# Patient Record
Sex: FEMALE | Race: OTHER
[De-identification: names, ages, dates, MRNs, and addresses within clinical notes are randomized per-mention and may not be internally consistent; named-entity substitution may affect disease eponyms.]

---

## 2017-10-12 ENCOUNTER — ASOB RESULT (OUTPATIENT)
Age: 32
End: 2017-10-12

## 2017-10-12 ENCOUNTER — APPOINTMENT (OUTPATIENT)
Dept: ANTEPARTUM | Facility: CLINIC | Age: 32
End: 2017-10-12
Payer: MEDICAID

## 2017-10-12 PROBLEM — Z00.00 ENCOUNTER FOR PREVENTIVE HEALTH EXAMINATION: Status: ACTIVE | Noted: 2017-10-12

## 2017-10-12 PROCEDURE — 36416 COLLJ CAPILLARY BLOOD SPEC: CPT

## 2017-10-12 PROCEDURE — 76813 OB US NUCHAL MEAS 1 GEST: CPT

## 2017-10-12 PROCEDURE — 76801 OB US < 14 WKS SINGLE FETUS: CPT

## 2017-11-24 ENCOUNTER — ASOB RESULT (OUTPATIENT)
Age: 32
End: 2017-11-24

## 2017-11-24 ENCOUNTER — APPOINTMENT (OUTPATIENT)
Dept: ANTEPARTUM | Facility: CLINIC | Age: 32
End: 2017-11-24
Payer: MEDICAID

## 2017-11-24 PROCEDURE — 76811 OB US DETAILED SNGL FETUS: CPT

## 2017-11-30 ENCOUNTER — ASOB RESULT (OUTPATIENT)
Age: 32
End: 2017-11-30

## 2017-11-30 ENCOUNTER — APPOINTMENT (OUTPATIENT)
Dept: ANTEPARTUM | Facility: CLINIC | Age: 32
End: 2017-11-30
Payer: MEDICAID

## 2017-11-30 PROCEDURE — 76816 OB US FOLLOW-UP PER FETUS: CPT

## 2018-01-22 ENCOUNTER — APPOINTMENT (OUTPATIENT)
Dept: MATERNAL FETAL MEDICINE | Facility: CLINIC | Age: 33
End: 2018-01-22
Payer: MEDICAID

## 2018-01-22 DIAGNOSIS — O24.919 UNSPECIFIED DIABETES MELLITUS IN PREGNANCY, UNSPECIFIED TRIMESTER: ICD-10-CM

## 2018-01-22 PROCEDURE — G0109 DIAB MANAGE TRN IND/GROUP: CPT

## 2018-01-22 RX ORDER — LANCETS
EACH MISCELLANEOUS
Qty: 1 | Refills: 6 | Status: ACTIVE | COMMUNITY
Start: 2018-01-22 | End: 1900-01-01

## 2018-01-24 ENCOUNTER — APPOINTMENT (OUTPATIENT)
Dept: MATERNAL FETAL MEDICINE | Facility: CLINIC | Age: 33
End: 2018-01-24
Payer: MEDICAID

## 2018-01-24 PROCEDURE — G0109 DIAB MANAGE TRN IND/GROUP: CPT

## 2018-02-05 ENCOUNTER — APPOINTMENT (OUTPATIENT)
Dept: MATERNAL FETAL MEDICINE | Facility: CLINIC | Age: 33
End: 2018-02-05
Payer: MEDICAID

## 2018-02-05 ENCOUNTER — ASOB RESULT (OUTPATIENT)
Age: 33
End: 2018-02-05

## 2018-02-05 PROCEDURE — G0108 DIAB MANAGE TRN  PER INDIV: CPT

## 2018-02-22 ENCOUNTER — APPOINTMENT (OUTPATIENT)
Dept: MATERNAL FETAL MEDICINE | Facility: CLINIC | Age: 33
End: 2018-02-22
Payer: MEDICAID

## 2018-02-22 ENCOUNTER — APPOINTMENT (OUTPATIENT)
Dept: ANTEPARTUM | Facility: CLINIC | Age: 33
End: 2018-02-22
Payer: MEDICAID

## 2018-02-22 ENCOUNTER — ASOB RESULT (OUTPATIENT)
Age: 33
End: 2018-02-22

## 2018-02-22 PROCEDURE — 76816 OB US FOLLOW-UP PER FETUS: CPT

## 2018-02-22 PROCEDURE — G0108 DIAB MANAGE TRN  PER INDIV: CPT

## 2018-03-16 ENCOUNTER — APPOINTMENT (OUTPATIENT)
Dept: MATERNAL FETAL MEDICINE | Facility: CLINIC | Age: 33
End: 2018-03-16
Payer: MEDICAID

## 2018-03-16 ENCOUNTER — ASOB RESULT (OUTPATIENT)
Age: 33
End: 2018-03-16

## 2018-03-16 PROCEDURE — G0108 DIAB MANAGE TRN  PER INDIV: CPT

## 2018-03-29 ENCOUNTER — ASOB RESULT (OUTPATIENT)
Age: 33
End: 2018-03-29

## 2018-03-29 ENCOUNTER — APPOINTMENT (OUTPATIENT)
Dept: ANTEPARTUM | Facility: CLINIC | Age: 33
End: 2018-03-29
Payer: MEDICAID

## 2018-03-29 PROCEDURE — 76816 OB US FOLLOW-UP PER FETUS: CPT

## 2018-03-30 ENCOUNTER — ASOB RESULT (OUTPATIENT)
Age: 33
End: 2018-03-30

## 2018-03-30 ENCOUNTER — APPOINTMENT (OUTPATIENT)
Dept: MATERNAL FETAL MEDICINE | Facility: CLINIC | Age: 33
End: 2018-03-30
Payer: MEDICAID

## 2018-03-30 PROCEDURE — G0108 DIAB MANAGE TRN  PER INDIV: CPT

## 2018-03-30 RX ORDER — BLOOD SUGAR DIAGNOSTIC
STRIP MISCELLANEOUS
Qty: 3 | Refills: 0 | Status: ACTIVE | COMMUNITY
Start: 2018-01-22 | End: 1900-01-01

## 2018-03-30 RX ORDER — URINE ACETONE TEST STRIPS
STRIP MISCELLANEOUS
Qty: 1 | Refills: 3 | Status: ACTIVE | COMMUNITY
Start: 2018-01-22 | End: 1900-01-01

## 2018-04-16 ENCOUNTER — ASOB RESULT (OUTPATIENT)
Age: 33
End: 2018-04-16

## 2018-04-16 ENCOUNTER — APPOINTMENT (OUTPATIENT)
Dept: ANTEPARTUM | Facility: CLINIC | Age: 33
End: 2018-04-16
Payer: MEDICAID

## 2018-04-16 PROCEDURE — 76819 FETAL BIOPHYS PROFIL W/O NST: CPT

## 2018-04-16 PROCEDURE — 76816 OB US FOLLOW-UP PER FETUS: CPT

## 2018-04-19 ENCOUNTER — ASOB RESULT (OUTPATIENT)
Age: 33
End: 2018-04-19

## 2018-04-19 ENCOUNTER — APPOINTMENT (OUTPATIENT)
Dept: ANTEPARTUM | Facility: CLINIC | Age: 33
End: 2018-04-19
Payer: MEDICAID

## 2018-04-19 PROCEDURE — 76818 FETAL BIOPHYS PROFILE W/NST: CPT

## 2018-04-22 ENCOUNTER — INPATIENT (INPATIENT)
Facility: HOSPITAL | Age: 33
LOS: 3 days | Discharge: HOME | End: 2018-04-26
Attending: OBSTETRICS & GYNECOLOGY | Admitting: OBSTETRICS & GYNECOLOGY
Payer: MEDICAID

## 2018-04-22 ENCOUNTER — TRANSCRIPTION ENCOUNTER (OUTPATIENT)
Age: 33
End: 2018-04-22

## 2018-04-22 VITALS — WEIGHT: 141.1 LBS | HEIGHT: 64 IN

## 2018-04-22 DIAGNOSIS — O24.419 GESTATIONAL DIABETES MELLITUS IN PREGNANCY, UNSPECIFIED CONTROL: ICD-10-CM

## 2018-04-22 LAB
BASOPHILS # BLD AUTO: 0 K/UL — SIGNIFICANT CHANGE UP (ref 0–0.2)
BASOPHILS NFR BLD AUTO: 0.5 % — SIGNIFICANT CHANGE UP (ref 0–2)
EOSINOPHIL # BLD AUTO: 0 K/UL — SIGNIFICANT CHANGE UP (ref 0–0.5)
EOSINOPHIL NFR BLD AUTO: 0.7 % — SIGNIFICANT CHANGE UP (ref 0–6)
HCT VFR BLD CALC: 35.1 % — SIGNIFICANT CHANGE UP (ref 34.5–45)
HGB BLD-MCNC: 12.5 G/DL — SIGNIFICANT CHANGE UP (ref 11.5–15.5)
LYMPHOCYTES # BLD AUTO: 1.8 K/UL — SIGNIFICANT CHANGE UP (ref 1–3.3)
LYMPHOCYTES # BLD AUTO: 26.6 % — SIGNIFICANT CHANGE UP (ref 13–44)
MCHC RBC-ENTMCNC: 34.5 PG — HIGH (ref 27–34)
MCHC RBC-ENTMCNC: 35.8 GM/DL — SIGNIFICANT CHANGE UP (ref 32–36)
MCV RBC AUTO: 96.3 FL — SIGNIFICANT CHANGE UP (ref 80–100)
MONOCYTES # BLD AUTO: 0.5 K/UL — SIGNIFICANT CHANGE UP (ref 0–0.9)
MONOCYTES NFR BLD AUTO: 7.5 % — SIGNIFICANT CHANGE UP (ref 2–14)
NEUTROPHILS # BLD AUTO: 4.4 K/UL — SIGNIFICANT CHANGE UP (ref 1.8–7.4)
NEUTROPHILS NFR BLD AUTO: 64.7 % — SIGNIFICANT CHANGE UP (ref 43–77)
PLATELET # BLD AUTO: 107 K/UL — LOW (ref 150–400)
RBC # BLD: 3.64 M/UL — LOW (ref 3.8–5.2)
RBC # FLD: 11.8 % — SIGNIFICANT CHANGE UP (ref 10.3–14.5)
WBC # BLD: 6.8 K/UL — SIGNIFICANT CHANGE UP (ref 3.8–10.5)
WBC # FLD AUTO: 6.8 K/UL — SIGNIFICANT CHANGE UP (ref 3.8–10.5)

## 2018-04-22 RX ORDER — SODIUM CHLORIDE 9 MG/ML
1000 INJECTION, SOLUTION INTRAVENOUS
Qty: 0 | Refills: 0 | Status: DISCONTINUED | OUTPATIENT
Start: 2018-04-22 | End: 2018-04-23

## 2018-04-22 RX ORDER — SODIUM CHLORIDE 9 MG/ML
1000 INJECTION, SOLUTION INTRAVENOUS ONCE
Qty: 0 | Refills: 0 | Status: DISCONTINUED | OUTPATIENT
Start: 2018-04-22 | End: 2018-04-23

## 2018-04-22 RX ORDER — CITRIC ACID/SODIUM CITRATE 300-500 MG
15 SOLUTION, ORAL ORAL EVERY 4 HOURS
Qty: 0 | Refills: 0 | Status: DISCONTINUED | OUTPATIENT
Start: 2018-04-22 | End: 2018-04-23

## 2018-04-22 RX ORDER — OXYTOCIN 10 UNIT/ML
333.33 VIAL (ML) INJECTION
Qty: 20 | Refills: 0 | Status: DISCONTINUED | OUTPATIENT
Start: 2018-04-22 | End: 2018-04-23

## 2018-04-22 RX ADMIN — Medication 15 MILLILITER(S): at 22:16

## 2018-04-23 ENCOUNTER — RESULT REVIEW (OUTPATIENT)
Age: 33
End: 2018-04-23

## 2018-04-23 LAB
BLD GP AB SCN SERPL QL: NEGATIVE — SIGNIFICANT CHANGE UP
GLUCOSE BLDC GLUCOMTR-MCNC: 100 MG/DL — HIGH (ref 70–99)
GLUCOSE BLDC GLUCOMTR-MCNC: 75 MG/DL — SIGNIFICANT CHANGE UP (ref 70–99)
GLUCOSE BLDC GLUCOMTR-MCNC: 77 MG/DL — SIGNIFICANT CHANGE UP (ref 70–99)
GLUCOSE BLDC GLUCOMTR-MCNC: 93 MG/DL — SIGNIFICANT CHANGE UP (ref 70–99)
GLUCOSE BLDC GLUCOMTR-MCNC: 96 MG/DL — SIGNIFICANT CHANGE UP (ref 70–99)
GLUCOSE BLDC GLUCOMTR-MCNC: 99 MG/DL — SIGNIFICANT CHANGE UP (ref 70–99)
RH IG SCN BLD-IMP: POSITIVE — SIGNIFICANT CHANGE UP
RH IG SCN BLD-IMP: POSITIVE — SIGNIFICANT CHANGE UP
T PALLIDUM AB TITR SER: NEGATIVE — SIGNIFICANT CHANGE UP

## 2018-04-23 PROCEDURE — 88307 TISSUE EXAM BY PATHOLOGIST: CPT | Mod: 26

## 2018-04-23 RX ORDER — DOCUSATE SODIUM 100 MG
100 CAPSULE ORAL
Qty: 0 | Refills: 0 | Status: DISCONTINUED | OUTPATIENT
Start: 2018-04-24 | End: 2018-04-26

## 2018-04-23 RX ORDER — GLYCERIN ADULT
1 SUPPOSITORY, RECTAL RECTAL AT BEDTIME
Qty: 0 | Refills: 0 | Status: DISCONTINUED | OUTPATIENT
Start: 2018-04-23 | End: 2018-04-23

## 2018-04-23 RX ORDER — LANOLIN
1 OINTMENT (GRAM) TOPICAL
Qty: 0 | Refills: 0 | Status: DISCONTINUED | OUTPATIENT
Start: 2018-04-24 | End: 2018-04-26

## 2018-04-23 RX ORDER — IBUPROFEN 200 MG
600 TABLET ORAL EVERY 6 HOURS
Qty: 0 | Refills: 0 | Status: COMPLETED | OUTPATIENT
Start: 2018-04-23 | End: 2019-03-22

## 2018-04-23 RX ORDER — OXYCODONE HYDROCHLORIDE 5 MG/1
5 TABLET ORAL
Qty: 0 | Refills: 0 | Status: COMPLETED | OUTPATIENT
Start: 2018-04-23 | End: 2018-04-30

## 2018-04-23 RX ORDER — OXYTOCIN 10 UNIT/ML
333.33 VIAL (ML) INJECTION
Qty: 20 | Refills: 0 | Status: DISCONTINUED | OUTPATIENT
Start: 2018-04-23 | End: 2018-04-26

## 2018-04-23 RX ORDER — SODIUM CHLORIDE 9 MG/ML
1000 INJECTION, SOLUTION INTRAVENOUS
Qty: 0 | Refills: 0 | Status: DISCONTINUED | OUTPATIENT
Start: 2018-04-23 | End: 2018-04-23

## 2018-04-23 RX ORDER — DIPHENHYDRAMINE HCL 50 MG
25 CAPSULE ORAL EVERY 6 HOURS
Qty: 0 | Refills: 0 | Status: DISCONTINUED | OUTPATIENT
Start: 2018-04-24 | End: 2018-04-26

## 2018-04-23 RX ORDER — DEXAMETHASONE 0.5 MG/5ML
4 ELIXIR ORAL EVERY 6 HOURS
Qty: 0 | Refills: 0 | Status: DISCONTINUED | OUTPATIENT
Start: 2018-04-23 | End: 2018-04-25

## 2018-04-23 RX ORDER — FERROUS SULFATE 325(65) MG
325 TABLET ORAL DAILY
Qty: 0 | Refills: 0 | Status: DISCONTINUED | OUTPATIENT
Start: 2018-04-24 | End: 2018-04-26

## 2018-04-23 RX ORDER — OXYTOCIN 10 UNIT/ML
41.67 VIAL (ML) INJECTION
Qty: 20 | Refills: 0 | Status: DISCONTINUED | OUTPATIENT
Start: 2018-04-24 | End: 2018-04-26

## 2018-04-23 RX ORDER — OXYCODONE HYDROCHLORIDE 5 MG/1
5 TABLET ORAL
Qty: 0 | Refills: 0 | Status: DISCONTINUED | OUTPATIENT
Start: 2018-04-23 | End: 2018-04-26

## 2018-04-23 RX ORDER — OXYTOCIN 10 UNIT/ML
4 VIAL (ML) INJECTION
Qty: 30 | Refills: 0 | Status: DISCONTINUED | OUTPATIENT
Start: 2018-04-23 | End: 2018-04-26

## 2018-04-23 RX ORDER — TETANUS TOXOID, REDUCED DIPHTHERIA TOXOID AND ACELLULAR PERTUSSIS VACCINE, ADSORBED 5; 2.5; 8; 8; 2.5 [IU]/.5ML; [IU]/.5ML; UG/.5ML; UG/.5ML; UG/.5ML
0.5 SUSPENSION INTRAMUSCULAR ONCE
Qty: 0 | Refills: 0 | Status: DISCONTINUED | OUTPATIENT
Start: 2018-04-24 | End: 2018-04-26

## 2018-04-23 RX ORDER — DIPHENHYDRAMINE HCL 50 MG
25 CAPSULE ORAL EVERY 6 HOURS
Qty: 0 | Refills: 0 | Status: DISCONTINUED | OUTPATIENT
Start: 2018-04-23 | End: 2018-04-23

## 2018-04-23 RX ORDER — SIMETHICONE 80 MG/1
80 TABLET, CHEWABLE ORAL EVERY 4 HOURS
Qty: 0 | Refills: 0 | Status: DISCONTINUED | OUTPATIENT
Start: 2018-04-24 | End: 2018-04-26

## 2018-04-23 RX ORDER — NALOXONE HYDROCHLORIDE 4 MG/.1ML
0.1 SPRAY NASAL
Qty: 0 | Refills: 0 | Status: DISCONTINUED | OUTPATIENT
Start: 2018-04-23 | End: 2018-04-25

## 2018-04-23 RX ORDER — HEPARIN SODIUM 5000 [USP'U]/ML
5000 INJECTION INTRAVENOUS; SUBCUTANEOUS EVERY 12 HOURS
Qty: 0 | Refills: 0 | Status: DISCONTINUED | OUTPATIENT
Start: 2018-04-24 | End: 2018-04-26

## 2018-04-23 RX ORDER — SODIUM CHLORIDE 9 MG/ML
1000 INJECTION, SOLUTION INTRAVENOUS
Qty: 0 | Refills: 0 | Status: DISCONTINUED | OUTPATIENT
Start: 2018-04-24 | End: 2018-04-26

## 2018-04-23 RX ORDER — ONDANSETRON 8 MG/1
4 TABLET, FILM COATED ORAL EVERY 6 HOURS
Qty: 0 | Refills: 0 | Status: DISCONTINUED | OUTPATIENT
Start: 2018-04-23 | End: 2018-04-25

## 2018-04-23 RX ORDER — OXYTOCIN 10 UNIT/ML
41.67 VIAL (ML) INJECTION
Qty: 20 | Refills: 0 | Status: DISCONTINUED | OUTPATIENT
Start: 2018-04-23 | End: 2018-04-26

## 2018-04-23 RX ORDER — GLYCERIN ADULT
1 SUPPOSITORY, RECTAL RECTAL AT BEDTIME
Qty: 0 | Refills: 0 | Status: DISCONTINUED | OUTPATIENT
Start: 2018-04-24 | End: 2018-04-26

## 2018-04-23 RX ORDER — OXYTOCIN 10 UNIT/ML
41.67 VIAL (ML) INJECTION
Qty: 20 | Refills: 0 | Status: DISCONTINUED | OUTPATIENT
Start: 2018-04-23 | End: 2018-04-23

## 2018-04-23 RX ORDER — SODIUM CHLORIDE 9 MG/ML
1000 INJECTION INTRAMUSCULAR; INTRAVENOUS; SUBCUTANEOUS
Qty: 0 | Refills: 0 | Status: DISCONTINUED | OUTPATIENT
Start: 2018-04-23 | End: 2018-04-26

## 2018-04-23 RX ORDER — FENTANYL CITRATE 50 UG/ML
250 INJECTION INTRAVENOUS
Qty: 0 | Refills: 0 | Status: DISCONTINUED | OUTPATIENT
Start: 2018-04-23 | End: 2018-04-25

## 2018-04-23 RX ORDER — FERROUS SULFATE 325(65) MG
325 TABLET ORAL DAILY
Qty: 0 | Refills: 0 | Status: DISCONTINUED | OUTPATIENT
Start: 2018-04-23 | End: 2018-04-23

## 2018-04-23 RX ORDER — OXYCODONE HYDROCHLORIDE 5 MG/1
5 TABLET ORAL EVERY 4 HOURS
Qty: 0 | Refills: 0 | Status: COMPLETED | OUTPATIENT
Start: 2018-04-23 | End: 2018-04-30

## 2018-04-23 RX ORDER — SODIUM CHLORIDE 9 MG/ML
1000 INJECTION, SOLUTION INTRAVENOUS
Qty: 0 | Refills: 0 | Status: DISCONTINUED | OUTPATIENT
Start: 2018-04-23 | End: 2018-04-24

## 2018-04-23 RX ORDER — ACETAMINOPHEN 500 MG
975 TABLET ORAL EVERY 6 HOURS
Qty: 0 | Refills: 0 | Status: DISCONTINUED | OUTPATIENT
Start: 2018-04-23 | End: 2018-04-26

## 2018-04-23 RX ORDER — SODIUM CHLORIDE 9 MG/ML
1000 INJECTION, SOLUTION INTRAVENOUS
Qty: 0 | Refills: 0 | Status: DISCONTINUED | OUTPATIENT
Start: 2018-04-23 | End: 2018-04-26

## 2018-04-23 RX ORDER — DOCUSATE SODIUM 100 MG
100 CAPSULE ORAL
Qty: 0 | Refills: 0 | Status: DISCONTINUED | OUTPATIENT
Start: 2018-04-23 | End: 2018-04-26

## 2018-04-23 RX ORDER — LANOLIN
1 OINTMENT (GRAM) TOPICAL
Qty: 0 | Refills: 0 | Status: DISCONTINUED | OUTPATIENT
Start: 2018-04-23 | End: 2018-04-26

## 2018-04-23 RX ORDER — TETANUS TOXOID, REDUCED DIPHTHERIA TOXOID AND ACELLULAR PERTUSSIS VACCINE, ADSORBED 5; 2.5; 8; 8; 2.5 [IU]/.5ML; [IU]/.5ML; UG/.5ML; UG/.5ML; UG/.5ML
0.5 SUSPENSION INTRAMUSCULAR ONCE
Qty: 0 | Refills: 0 | Status: DISCONTINUED | OUTPATIENT
Start: 2018-04-23 | End: 2018-04-26

## 2018-04-23 RX ORDER — KETOROLAC TROMETHAMINE 30 MG/ML
30 SYRINGE (ML) INJECTION EVERY 6 HOURS
Qty: 0 | Refills: 0 | Status: DISCONTINUED | OUTPATIENT
Start: 2018-04-23 | End: 2018-04-25

## 2018-04-23 RX ORDER — SIMETHICONE 80 MG/1
80 TABLET, CHEWABLE ORAL EVERY 4 HOURS
Qty: 0 | Refills: 0 | Status: DISCONTINUED | OUTPATIENT
Start: 2018-04-23 | End: 2018-04-23

## 2018-04-23 RX ORDER — DIPHENHYDRAMINE HCL 50 MG
25 CAPSULE ORAL EVERY 4 HOURS
Qty: 0 | Refills: 0 | Status: DISCONTINUED | OUTPATIENT
Start: 2018-04-23 | End: 2018-04-25

## 2018-04-23 RX ADMIN — Medication 1000 MILLIUNIT(S)/MIN: at 21:48

## 2018-04-23 RX ADMIN — Medication 4 MILLIUNIT(S)/MIN: at 14:01

## 2018-04-23 RX ADMIN — FENTANYL CITRATE 250 MILLILITER(S): 50 INJECTION INTRAVENOUS at 22:58

## 2018-04-23 RX ADMIN — Medication 0.2 MILLIGRAM(S): at 21:49

## 2018-04-23 RX ADMIN — Medication 15 MILLILITER(S): at 14:00

## 2018-04-23 RX ADMIN — Medication 15 MILLILITER(S): at 02:20

## 2018-04-23 RX ADMIN — Medication 0.25 MILLIGRAM(S): at 21:26

## 2018-04-24 LAB
HCT VFR BLD CALC: 30.8 % — LOW (ref 34.5–45)
HGB BLD-MCNC: 10.9 G/DL — LOW (ref 11.5–15.5)
MCHC RBC-ENTMCNC: 34.3 PG — HIGH (ref 27–34)
MCHC RBC-ENTMCNC: 35.4 GM/DL — SIGNIFICANT CHANGE UP (ref 32–36)
MCV RBC AUTO: 96.8 FL — SIGNIFICANT CHANGE UP (ref 80–100)
PLATELET # BLD AUTO: 87 K/UL — LOW (ref 150–400)
RBC # BLD: 3.19 M/UL — LOW (ref 3.8–5.2)
RBC # FLD: 12 % — SIGNIFICANT CHANGE UP (ref 10.3–14.5)
WBC # BLD: 16.1 K/UL — HIGH (ref 3.8–10.5)
WBC # FLD AUTO: 16.1 K/UL — HIGH (ref 3.8–10.5)

## 2018-04-24 RX ADMIN — SIMETHICONE 80 MILLIGRAM(S): 80 TABLET, CHEWABLE ORAL at 13:40

## 2018-04-24 RX ADMIN — FENTANYL CITRATE 250 MILLILITER(S): 50 INJECTION INTRAVENOUS at 17:22

## 2018-04-24 RX ADMIN — Medication 30 MILLIGRAM(S): at 11:10

## 2018-04-24 RX ADMIN — Medication 30 MILLIGRAM(S): at 04:11

## 2018-04-24 RX ADMIN — Medication 1 TABLET(S): at 13:38

## 2018-04-24 RX ADMIN — HEPARIN SODIUM 5000 UNIT(S): 5000 INJECTION INTRAVENOUS; SUBCUTANEOUS at 16:50

## 2018-04-24 RX ADMIN — HEPARIN SODIUM 5000 UNIT(S): 5000 INJECTION INTRAVENOUS; SUBCUTANEOUS at 04:11

## 2018-04-24 RX ADMIN — Medication 30 MILLIGRAM(S): at 17:12

## 2018-04-24 RX ADMIN — FENTANYL CITRATE 250 MILLILITER(S): 50 INJECTION INTRAVENOUS at 02:36

## 2018-04-24 RX ADMIN — Medication 30 MILLIGRAM(S): at 22:28

## 2018-04-24 RX ADMIN — Medication 325 MILLIGRAM(S): at 13:39

## 2018-04-24 RX ADMIN — Medication 30 MILLIGRAM(S): at 23:00

## 2018-04-24 RX ADMIN — Medication 30 MILLIGRAM(S): at 04:41

## 2018-04-24 RX ADMIN — Medication 30 MILLIGRAM(S): at 10:40

## 2018-04-24 RX ADMIN — Medication 30 MILLIGRAM(S): at 16:42

## 2018-04-24 NOTE — DIETITIAN INITIAL EVALUATION ADULT. - ENERGY NEEDS
Height: 64 inches, Weight: 133 pounds (pre-pregnancy wt)  BMI: 22.8 kg/m2 IBW: 120 pounds (+/-10%), %IBW: 111%  Pertinent Info: Per chart, 34 y/o female , s/p C/S @ 41weeks POD #1.

## 2018-04-24 NOTE — DIETITIAN INITIAL EVALUATION ADULT. - PERTINENT MEDS FT
lactated ringer @ 125cc/hr, D5%+NaCl 0.45% @ 125cc/hr, colace, ferrous sulfate, prenatal MVI, decadron, zofran

## 2018-04-24 NOTE — DIETITIAN INITIAL EVALUATION ADULT. - NS AS NUTRI INTERV ED CONTENT
Postpartum GDM diet education provided. Encouraged pt to continue good po intake of meals, select healthier choices of food, limiting over consumption of simple sugars/beverages to avoid the increased risk of developing T2DM, incorporating more nutrient dense foods such as fruits, vegetables, lean meats, and whole grains. Discussed the increase in nutrient needs if pt decides to breastfeed. Encouraged pt to follow up with MD for 2 hour GTT from 4-12 weeks postpartum. Recommended pt set wt loss goal to 2-4 pounds a month to promote healthy wt management and physical activity once cleared by MD.

## 2018-04-24 NOTE — DIETITIAN INITIAL EVALUATION ADULT. - OTHER INFO
Nutrition consult received for GDM diet controlled. Pt reports fair appetite and po intakes, some nausea yesterday but feels much better today. NKFA. PTA was taking prenatal MVI. Pt plans to breast feed, receptive to post partum GDM diet education provided.

## 2018-04-25 LAB
HCT VFR BLD CALC: 28.5 % — LOW (ref 34.5–45)
HGB BLD-MCNC: 10.1 G/DL — LOW (ref 11.5–15.5)
MCHC RBC-ENTMCNC: 34.6 PG — HIGH (ref 27–34)
MCHC RBC-ENTMCNC: 35.4 GM/DL — SIGNIFICANT CHANGE UP (ref 32–36)
MCV RBC AUTO: 97.6 FL — SIGNIFICANT CHANGE UP (ref 80–100)
PLATELET # BLD AUTO: 92 K/UL — LOW (ref 150–400)
RBC # BLD: 2.91 M/UL — LOW (ref 3.8–5.2)
RBC # FLD: 11.8 % — SIGNIFICANT CHANGE UP (ref 10.3–14.5)
WBC # BLD: 14.1 K/UL — HIGH (ref 3.8–10.5)
WBC # FLD AUTO: 14.1 K/UL — HIGH (ref 3.8–10.5)

## 2018-04-25 RX ORDER — IBUPROFEN 200 MG
600 TABLET ORAL EVERY 6 HOURS
Qty: 0 | Refills: 0 | Status: DISCONTINUED | OUTPATIENT
Start: 2018-04-25 | End: 2018-04-26

## 2018-04-25 RX ORDER — OXYCODONE HYDROCHLORIDE 5 MG/1
5 TABLET ORAL
Qty: 0 | Refills: 0 | Status: DISCONTINUED | OUTPATIENT
Start: 2018-04-25 | End: 2018-04-26

## 2018-04-25 RX ORDER — OXYCODONE HYDROCHLORIDE 5 MG/1
5 TABLET ORAL EVERY 4 HOURS
Qty: 0 | Refills: 0 | Status: DISCONTINUED | OUTPATIENT
Start: 2018-04-25 | End: 2018-04-26

## 2018-04-25 RX ADMIN — OXYCODONE HYDROCHLORIDE 5 MILLIGRAM(S): 5 TABLET ORAL at 16:30

## 2018-04-25 RX ADMIN — HEPARIN SODIUM 5000 UNIT(S): 5000 INJECTION INTRAVENOUS; SUBCUTANEOUS at 17:15

## 2018-04-25 RX ADMIN — HEPARIN SODIUM 5000 UNIT(S): 5000 INJECTION INTRAVENOUS; SUBCUTANEOUS at 05:25

## 2018-04-25 RX ADMIN — Medication 325 MILLIGRAM(S): at 13:10

## 2018-04-25 RX ADMIN — Medication 600 MILLIGRAM(S): at 18:56

## 2018-04-25 RX ADMIN — Medication 975 MILLIGRAM(S): at 15:49

## 2018-04-25 RX ADMIN — OXYCODONE HYDROCHLORIDE 5 MILLIGRAM(S): 5 TABLET ORAL at 19:30

## 2018-04-25 RX ADMIN — OXYCODONE HYDROCHLORIDE 5 MILLIGRAM(S): 5 TABLET ORAL at 22:44

## 2018-04-25 RX ADMIN — OXYCODONE HYDROCHLORIDE 5 MILLIGRAM(S): 5 TABLET ORAL at 15:49

## 2018-04-25 RX ADMIN — OXYCODONE HYDROCHLORIDE 5 MILLIGRAM(S): 5 TABLET ORAL at 13:10

## 2018-04-25 RX ADMIN — OXYCODONE HYDROCHLORIDE 5 MILLIGRAM(S): 5 TABLET ORAL at 23:15

## 2018-04-25 RX ADMIN — OXYCODONE HYDROCHLORIDE 5 MILLIGRAM(S): 5 TABLET ORAL at 09:30

## 2018-04-25 RX ADMIN — Medication 30 MILLIGRAM(S): at 05:22

## 2018-04-25 RX ADMIN — Medication 975 MILLIGRAM(S): at 08:51

## 2018-04-25 RX ADMIN — OXYCODONE HYDROCHLORIDE 5 MILLIGRAM(S): 5 TABLET ORAL at 08:51

## 2018-04-25 RX ADMIN — SIMETHICONE 80 MILLIGRAM(S): 80 TABLET, CHEWABLE ORAL at 15:49

## 2018-04-25 RX ADMIN — Medication 975 MILLIGRAM(S): at 09:30

## 2018-04-25 RX ADMIN — OXYCODONE HYDROCHLORIDE 5 MILLIGRAM(S): 5 TABLET ORAL at 18:56

## 2018-04-25 RX ADMIN — Medication 600 MILLIGRAM(S): at 19:30

## 2018-04-25 RX ADMIN — OXYCODONE HYDROCHLORIDE 5 MILLIGRAM(S): 5 TABLET ORAL at 14:00

## 2018-04-25 RX ADMIN — Medication 30 MILLIGRAM(S): at 04:41

## 2018-04-25 RX ADMIN — SIMETHICONE 80 MILLIGRAM(S): 80 TABLET, CHEWABLE ORAL at 05:27

## 2018-04-25 RX ADMIN — Medication 975 MILLIGRAM(S): at 16:30

## 2018-04-26 ENCOUNTER — TRANSCRIPTION ENCOUNTER (OUTPATIENT)
Age: 33
End: 2018-04-26

## 2018-04-26 VITALS
DIASTOLIC BLOOD PRESSURE: 60 MMHG | TEMPERATURE: 98 F | RESPIRATION RATE: 18 BRPM | SYSTOLIC BLOOD PRESSURE: 95 MMHG | HEART RATE: 65 BPM

## 2018-04-26 LAB
HCT VFR BLD CALC: 29.7 % — LOW (ref 34.5–45)
HGB BLD-MCNC: 10.4 G/DL — LOW (ref 11.5–15.5)
MCHC RBC-ENTMCNC: 34 PG — SIGNIFICANT CHANGE UP (ref 27–34)
MCHC RBC-ENTMCNC: 34.9 GM/DL — SIGNIFICANT CHANGE UP (ref 32–36)
MCV RBC AUTO: 97.5 FL — SIGNIFICANT CHANGE UP (ref 80–100)
PLATELET # BLD AUTO: 127 K/UL — LOW (ref 150–400)
RBC # BLD: 3.05 M/UL — LOW (ref 3.8–5.2)
RBC # FLD: 11.7 % — SIGNIFICANT CHANGE UP (ref 10.3–14.5)
WBC # BLD: 12.8 K/UL — HIGH (ref 3.8–10.5)
WBC # FLD AUTO: 12.8 K/UL — HIGH (ref 3.8–10.5)

## 2018-04-26 PROCEDURE — 82962 GLUCOSE BLOOD TEST: CPT

## 2018-04-26 PROCEDURE — 59025 FETAL NON-STRESS TEST: CPT

## 2018-04-26 PROCEDURE — 86850 RBC ANTIBODY SCREEN: CPT

## 2018-04-26 PROCEDURE — 59050 FETAL MONITOR W/REPORT: CPT

## 2018-04-26 PROCEDURE — 86901 BLOOD TYPING SEROLOGIC RH(D): CPT

## 2018-04-26 PROCEDURE — 86900 BLOOD TYPING SEROLOGIC ABO: CPT

## 2018-04-26 PROCEDURE — 86780 TREPONEMA PALLIDUM: CPT

## 2018-04-26 PROCEDURE — 88307 TISSUE EXAM BY PATHOLOGIST: CPT

## 2018-04-26 PROCEDURE — 85027 COMPLETE CBC AUTOMATED: CPT

## 2018-04-26 RX ORDER — IBUPROFEN 200 MG
1 TABLET ORAL
Qty: 0 | Refills: 0 | COMMUNITY
Start: 2018-04-26

## 2018-04-26 RX ORDER — DOCUSATE SODIUM 100 MG
1 CAPSULE ORAL
Qty: 0 | Refills: 0 | COMMUNITY
Start: 2018-04-26

## 2018-04-26 RX ORDER — ACETAMINOPHEN 500 MG
3 TABLET ORAL
Qty: 0 | Refills: 0 | COMMUNITY
Start: 2018-04-26

## 2018-04-26 RX ORDER — FERROUS SULFATE 325(65) MG
1 TABLET ORAL
Qty: 0 | Refills: 0 | COMMUNITY
Start: 2018-04-26 | End: 2018-05-31

## 2018-04-26 RX ADMIN — Medication 975 MILLIGRAM(S): at 12:16

## 2018-04-26 RX ADMIN — SIMETHICONE 80 MILLIGRAM(S): 80 TABLET, CHEWABLE ORAL at 00:59

## 2018-04-26 RX ADMIN — SIMETHICONE 80 MILLIGRAM(S): 80 TABLET, CHEWABLE ORAL at 08:34

## 2018-04-26 RX ADMIN — Medication 975 MILLIGRAM(S): at 12:46

## 2018-04-26 RX ADMIN — Medication 325 MILLIGRAM(S): at 12:24

## 2018-04-26 RX ADMIN — Medication 600 MILLIGRAM(S): at 00:59

## 2018-04-26 RX ADMIN — Medication 1 TABLET(S): at 12:18

## 2018-04-26 RX ADMIN — Medication 600 MILLIGRAM(S): at 01:30

## 2018-04-26 RX ADMIN — Medication 975 MILLIGRAM(S): at 05:57

## 2018-04-26 RX ADMIN — HEPARIN SODIUM 5000 UNIT(S): 5000 INJECTION INTRAVENOUS; SUBCUTANEOUS at 05:57

## 2018-04-26 RX ADMIN — OXYCODONE HYDROCHLORIDE 5 MILLIGRAM(S): 5 TABLET ORAL at 01:30

## 2018-04-26 RX ADMIN — OXYCODONE HYDROCHLORIDE 5 MILLIGRAM(S): 5 TABLET ORAL at 00:59

## 2018-04-26 NOTE — DISCHARGE NOTE OB - MEDICATION SUMMARY - MEDICATIONS TO TAKE
I will START or STAY ON the medications listed below when I get home from the hospital:    acetaminophen 325 mg oral tablet  -- 3 tab(s) by mouth every 6 hours  -- Indication: For  delivery delivered    ibuprofen 600 mg oral tablet  -- 1 tab(s) by mouth every 6 hours  -- Indication: For  delivery delivered    ferrous sulfate  -- 1 tab(s) by mouth once a day  -- Indication: For  delivery delivered    Prenatal Multivitamins with Folic Acid 1 mg oral tablet  -- 1 tab(s) by mouth once a day  -- Indication: For  delivery delivered    docusate sodium 100 mg oral capsule  -- 1 cap(s) by mouth 2 times a day, As needed, Stool Softening  -- Indication: For  delivery delivered    docusate sodium 100 mg oral capsule  -- 1 cap(s) by mouth 2 times a day, As needed, Stool Softening  -- Indication: For  delivery delivered

## 2018-04-26 NOTE — PROGRESS NOTE ADULT - ASSESSMENT
33y y.o. G 2 P  1011     POD # 3  S/P Primary C/S for Failure to Progress/NRNST in stable condition.

## 2018-04-26 NOTE — DISCHARGE NOTE OB - MATERIALS PROVIDED
Shaken Baby Prevention Handout/Breastfeeding Guide and Packet/[facility] Hearing Screen Program/Breastfeeding Log/Back To Sleep Handout/Breastfeeding Mother’s Support Group Information/Guide to Postpartum Care/[facility] [place] Screening Program/Birth Certificate Instructions/Discharge Medication Information for Patients and Families Pocket Guide

## 2018-04-26 NOTE — DISCHARGE NOTE OB - PATIENT PORTAL LINK FT
You can access the [facility] Patient Portal, offered by [facility], by registering with the following website: [email or web address]

## 2018-04-26 NOTE — PROGRESS NOTE ADULT - SUBJECTIVE AND OBJECTIVE BOX
Day 1 of Anesthesia Pain Management Service    SUBJECTIVE: I'm okay  Pain Scale Score:    [X] Refer to charted pain scores    THERAPY: Epidural Bupivacaine 0.01 % and Fentanyl 3 micrograms/mL     Demand Dose: 3 mL  Lockout: 15 minutes   Continuous Rate:  10 mL    MEDICATIONS  (STANDING):  acetaminophen   Tablet. 975 milliGRAM(s) Oral every 6 hours  dextrose 5% + sodium chloride 0.45%. 1000 milliLiter(s) (125 mL/Hr) IV Continuous <Continuous>  diphtheria/tetanus/pertussis (acellular) Vaccine (ADAcel) 0.5 milliLiter(s) IntraMuscular once  diphtheria/tetanus/pertussis (acellular) Vaccine (ADAcel) 0.5 milliLiter(s) IntraMuscular once  fentaNYL (3 MICROgram(s)/mL) + BUpivacaine 0.01% in 0.9% Sodium Chloride PCEA 250 milliLiter(s) Epidural PCA Continuous  ferrous    sulfate 325 milliGRAM(s) Oral daily  heparin  Injectable 5000 Unit(s) SubCutaneous every 12 hours  ibuprofen  Tablet 600 milliGRAM(s) Oral every 6 hours  ketorolac   Injectable 30 milliGRAM(s) IV Push every 6 hours  lactated ringers. 1000 milliLiter(s) (125 mL/Hr) IV Continuous <Continuous>  oxyCODONE    IR 5 milliGRAM(s) Oral every 3 hours  oxyCODONE    IR 5 milliGRAM(s) Oral every 3 hours  oxytocin Infusion 41.667 milliUNIT(s)/Min (125 mL/Hr) IV Continuous <Continuous>  oxytocin Infusion 333.333 milliUNIT(s)/Min (1000 mL/Hr) IV Continuous <Continuous>  oxytocin Infusion 41.667 milliUNIT(s)/Min (125 mL/Hr) IV Continuous <Continuous>  oxytocin Infusion 4 milliUNIT(s)/Min (4 mL/Hr) IV Continuous <Continuous>  oxytocin Infusion 333.333 milliUNIT(s)/Min (1000 mL/Hr) IV Continuous <Continuous>  prenatal multivitamin 1 Tablet(s) Oral daily  sodium chloride 0.9%. 1000 milliLiter(s) (125 mL/Hr) IV Continuous <Continuous>    MEDICATIONS  (PRN):  dexamethasone  Injectable 4 milliGRAM(s) IV Push every 6 hours PRN Nausea, IF ondansetron is ineffective after 30 - 60 minutes  diphenhydrAMINE   Capsule 25 milliGRAM(s) Oral every 6 hours PRN Itching  diphenhydrAMINE   Injectable 25 milliGRAM(s) IV Push every 4 hours PRN Pruritus  docusate sodium 100 milliGRAM(s) Oral two times a day PRN Stool Softening  docusate sodium 100 milliGRAM(s) Oral two times a day PRN Stool Softening  fentaNYL (3 MICROgram(s)/mL) + BUpivacaine 0.01% in 0.9% Sodium Chloride PCEA Rescue Clinician Bolus 5 milliLiter(s) Epidural every 15 minutes PRN Moderate Pain (4 - 6)  glycerin Suppository - Adult 1 Suppository(s) Rectal at bedtime PRN Constipation  lanolin Ointment 1 Application(s) Topical every 3 hours PRN Sore Nipples  lanolin Ointment 1 Application(s) Topical every 3 hours PRN Sore Nipples  naloxone Injectable 0.1 milliGRAM(s) IV Push every 3 minutes PRN For ANY of the following changes in patient status:  A. RR LESS THAN 10 breaths per minute, B. Oxygen saturation LESS THAN 90%, C. Sedation score of 6  ondansetron Injectable 4 milliGRAM(s) IV Push every 6 hours PRN Nausea  oxyCODONE    IR 5 milliGRAM(s) Oral every 4 hours PRN Severe Pain (7 - 10)  simethicone 80 milliGRAM(s) Chew every 4 hours PRN Gas      OBJECTIVE:    Assessment of Epidural Catheter Site: 	    [X] Dressing intact	[X] Site non-tender	[X] Site without erythema, discharge, edema  [X] Epidural tubing and connection checked	[X] Gross neurological exam within normal limits  [ ] Catheter removed – tip intact		                          10.9   16.1  )-----------( 87       ( 24 Apr 2018 06:00 )             30.8     Vital Signs Last 24 Hrs  T(C): 36.7 (04-24-18 @ 08:46), Max: 37.2 (04-24-18 @ 00:40)  T(F): 98 (04-24-18 @ 08:46), Max: 99 (04-24-18 @ 00:40)  HR: 58 (04-24-18 @ 08:46) (58 - 90)  BP: 130/70 (04-24-18 @ 08:46) (118/57 - 140/63)  BP(mean): 83 (04-24-18 @ 00:40) (81 - 94)  RR: 20 (04-24-18 @ 08:46) (16 - 30)  SpO2: 98% (04-24-18 @ 05:30) (96% - 100%)      Sedation Score:	[X] Alert	[ ] Drowsy	[ ] Arousable  [ ] Asleep  [ ] Unresponsive    Side Effects:	[X] None	[ ] Nausea	[ ] Vomiting  [ ] Pruritus  		[ ] Weakness  [ ] Numbness  [ ] Other:    ASSESSMENT/ PLAN:    Therapy:                         [X] Continue   [ ] Discontinue   [ ] Change to PRN Analgesics    Documentation and Verification of current medications:  [X] Done	[ ] Not done, not eligible, reason:    Comments:
Day 2 of Anesthesia Pain Management Service    SUBJECTIVE: I'm okay  Pain Scale Score:    [X] Refer to charted pain scores    THERAPY: Epidural Bupivacaine 0.01 % and Fentanyl 3 micrograms/mL     Demand Dose: 3 mL  Lockout: 15 minutes   Continuous Rate:  10 mL    MEDICATIONS  (STANDING):  acetaminophen   Tablet. 975 milliGRAM(s) Oral every 6 hours  dextrose 5% + sodium chloride 0.45%. 1000 milliLiter(s) (125 mL/Hr) IV Continuous <Continuous>  diphtheria/tetanus/pertussis (acellular) Vaccine (ADAcel) 0.5 milliLiter(s) IntraMuscular once  diphtheria/tetanus/pertussis (acellular) Vaccine (ADAcel) 0.5 milliLiter(s) IntraMuscular once  ferrous    sulfate 325 milliGRAM(s) Oral daily  heparin  Injectable 5000 Unit(s) SubCutaneous every 12 hours  ibuprofen  Tablet 600 milliGRAM(s) Oral every 6 hours  ketorolac   Injectable 30 milliGRAM(s) IV Push every 6 hours  lactated ringers. 1000 milliLiter(s) (125 mL/Hr) IV Continuous <Continuous>  oxyCODONE    IR 5 milliGRAM(s) Oral every 3 hours  oxyCODONE    IR 5 milliGRAM(s) Oral every 3 hours  oxytocin Infusion 41.667 milliUNIT(s)/Min (125 mL/Hr) IV Continuous <Continuous>  oxytocin Infusion 333.333 milliUNIT(s)/Min (1000 mL/Hr) IV Continuous <Continuous>  oxytocin Infusion 41.667 milliUNIT(s)/Min (125 mL/Hr) IV Continuous <Continuous>  oxytocin Infusion 4 milliUNIT(s)/Min (4 mL/Hr) IV Continuous <Continuous>  oxytocin Infusion 333.333 milliUNIT(s)/Min (1000 mL/Hr) IV Continuous <Continuous>  prenatal multivitamin 1 Tablet(s) Oral daily  sodium chloride 0.9%. 1000 milliLiter(s) (125 mL/Hr) IV Continuous <Continuous>    MEDICATIONS  (PRN):  diphenhydrAMINE   Capsule 25 milliGRAM(s) Oral every 6 hours PRN Itching  docusate sodium 100 milliGRAM(s) Oral two times a day PRN Stool Softening  docusate sodium 100 milliGRAM(s) Oral two times a day PRN Stool Softening  glycerin Suppository - Adult 1 Suppository(s) Rectal at bedtime PRN Constipation  lanolin Ointment 1 Application(s) Topical every 3 hours PRN Sore Nipples  lanolin Ointment 1 Application(s) Topical every 3 hours PRN Sore Nipples  oxyCODONE    IR 5 milliGRAM(s) Oral every 4 hours PRN Severe Pain (7 - 10)  simethicone 80 milliGRAM(s) Chew every 4 hours PRN Gas      OBJECTIVE:    Assessment of Epidural Catheter Site: 	    [ ] Dressing intact	[X] Site non-tender	[X] Site without erythema, discharge, edema  [ ] Epidural tubing and connection checked	[X] Gross neurological exam within normal limits  [X] Catheter removed                          10.1   14.1  )-----------( 92       ( 25 Apr 2018 07:03 )             28.5     Vital Signs Last 24 Hrs  T(C): 37 (04-25-18 @ 05:00), Max: 37 (04-25-18 @ 05:00)  T(F): 98.6 (04-25-18 @ 05:00), Max: 98.6 (04-25-18 @ 05:00)  HR: 64 (04-25-18 @ 05:00) (58 - 71)  BP: 100/61 (04-25-18 @ 05:00) (100/61 - 130/70)  BP(mean): --  RR: 18 (04-25-18 @ 05:00) (18 - 20)  SpO2: 98% (04-25-18 @ 00:20) (98% - 98%)      Sedation Score:	[X] Alert	[ ] Drowsy	[ ] Arousable  [ ] Asleep  [ ] Unresponsive    Side Effects:	[X] None	[ ] Nausea	[ ] Vomiting  [ ] Pruritus  		[ ] Weakness  [ ] Numbness  [ ] Other:    ASSESSMENT/ PLAN:    Therapy:                         [ ] Continue   [X] Discontinue   [X] Change to PRN Analgesics    Documentation and Verification of current medications:  [X] Done	[ ] Not done, not eligible, reason:    Comments: PLT count 92 from 87.  Will d/c epidural
PA POD # 3  C/S Note    Blood Type:   A+               Rubella:   Immune                RPR:  NR    Pain:  Controlled per pain protocol  Complaints:  None  Pt. is ambulating, Voiding, passing flatus, & tolerating regular diet.  Lochia:  WNL  Breastfeeding    VS:  T(C): 36.6 (04-26-18 @ 05:00), Max: 37 (04-25-18 @ 09:33)  HR: 65 (04-26-18 @ 05:00) (60 - 71)  BP: 95/60 (04-26-18 @ 05:00) (95/60 - 108/69)  RR: 18 (04-26-18 @ 05:00) (18 - 18)                  10.4   12.8  )-----------( 127      ( 26 Apr 2018 06:10 )             29.7     Abd:  Soft, non-distended, non-tender            Fundus-firm            Incision- C/D/I-SQ  Extremities:  Edema - 1+                     Calf Tenderness - none    Assessment:    33y y.o. G 2 P  1011     POD # 3  S/P Primary C/S for Failure to Progress/NRNST in stable condition.    PMHx significant for:  GDMA1-controlled  Current Issues:    Plan:  Increase OOB             Cont. Pain Protocol             Cont. Reg. Diet             Cont. PO Care.            Cont. DVT PPx            Check CBC    FERMIN Bonilla
Pain Management Attending Addendum    SUBJECTIVE: Patient doing well with PCEA    Therapy:    [X] PCEA    OBJECTIVE:   [X] Pain appropriately controlled    [ ] Other:    Side Effects:  [X] None	             [ ] Nausea              [ ] Pruritis        [ ] Weakness          [ ] Numbness        	[ ] Other:    ASSESSMENT/PLAN:    [ ] Continue current therapy    [X] Therapy changed to:    [X] PRN Analgesics   [ ] IV PCA    Comments:
Pain Management Attending Addendum    SUBJECTIVE: Patient doing well with PCEA    Therapy:    [X] PCEA    OBJECTIVE:   [X] Pain appropriately controlled    [ ] Other:    Side Effects:  [X] None	             [ ] Nausea              [ ] Pruritis        [ ] Weakness          [ ] Numbness        	[ ] Other:    ASSESSMENT/PLAN:    [X] Continue current therapy    [ ] Therapy changed to:    [ ] PRN Analgesics   [ ] IV PCA    Comments:
Postpartum Note,  Section  She is a  33y woman who is now post-operative day 1:     Subjective:  The patient feels well.  She is ambulating.   She is tolerating regular diet.  She denies nausea and vomiting.  She is voiding.  Her pain is controlled.  She reports normal postpartum bleeding.    Physical exam:    Vital Signs Last 24 Hrs  T(C): 36.7 (2018 05:30), Max: 37.2 (2018 00:40)  T(F): 98.1 (2018 05:30), Max: 99 (2018 00:40)  HR: 71 (2018 03:15) (70 - 90)  BP: 126/68 (2018 03:15) (118/57 - 140/63)  BP(mean): 83 (2018 00:40) (81 - 94)  RR: 18 (2018 05:30) (16 - 30)  SpO2: 98% (2018 05:30) (96% - 100%)    Gen: NAD  Breast: Soft, nontender, not engorged.  Abdomen: Soft, nontender, no distension , firm uterine fundus at umbilicus.  Incision: Clean, dry, and intact with steri strips  Pelvic: Normal lochia noted  Ext: No calf tenderness    LABS:                        12.5   6.8   )-----------( 107      ( 2018 21:40 )             35.1                   Allergies    penicillin (Anaphylaxis)    Intolerances      MEDICATIONS  (STANDING):  acetaminophen   Tablet. 975 milliGRAM(s) Oral every 6 hours  dextrose 5% + sodium chloride 0.45%. 1000 milliLiter(s) (125 mL/Hr) IV Continuous <Continuous>  diphtheria/tetanus/pertussis (acellular) Vaccine (ADAcel) 0.5 milliLiter(s) IntraMuscular once  diphtheria/tetanus/pertussis (acellular) Vaccine (ADAcel) 0.5 milliLiter(s) IntraMuscular once  fentaNYL (3 MICROgram(s)/mL) + BUpivacaine 0.01% in 0.9% Sodium Chloride PCEA 250 milliLiter(s) Epidural PCA Continuous  ferrous    sulfate 325 milliGRAM(s) Oral daily  heparin  Injectable 5000 Unit(s) SubCutaneous every 12 hours  ibuprofen  Tablet 600 milliGRAM(s) Oral every 6 hours  ketorolac   Injectable 30 milliGRAM(s) IV Push every 6 hours  lactated ringers. 1000 milliLiter(s) (125 mL/Hr) IV Continuous <Continuous>  oxyCODONE    IR 5 milliGRAM(s) Oral every 3 hours  oxyCODONE    IR 5 milliGRAM(s) Oral every 3 hours  oxytocin Infusion 41.667 milliUNIT(s)/Min (125 mL/Hr) IV Continuous <Continuous>  oxytocin Infusion 333.333 milliUNIT(s)/Min (1000 mL/Hr) IV Continuous <Continuous>  oxytocin Infusion 41.667 milliUNIT(s)/Min (125 mL/Hr) IV Continuous <Continuous>  oxytocin Infusion 4 milliUNIT(s)/Min (4 mL/Hr) IV Continuous <Continuous>  oxytocin Infusion 333.333 milliUNIT(s)/Min (1000 mL/Hr) IV Continuous <Continuous>  prenatal multivitamin 1 Tablet(s) Oral daily  sodium chloride 0.9%. 1000 milliLiter(s) (125 mL/Hr) IV Continuous <Continuous>    MEDICATIONS  (PRN):  dexamethasone  Injectable 4 milliGRAM(s) IV Push every 6 hours PRN Nausea, IF ondansetron is ineffective after 30 - 60 minutes  diphenhydrAMINE   Capsule 25 milliGRAM(s) Oral every 6 hours PRN Itching  diphenhydrAMINE   Injectable 25 milliGRAM(s) IV Push every 4 hours PRN Pruritus  docusate sodium 100 milliGRAM(s) Oral two times a day PRN Stool Softening  docusate sodium 100 milliGRAM(s) Oral two times a day PRN Stool Softening  fentaNYL (3 MICROgram(s)/mL) + BUpivacaine 0.01% in 0.9% Sodium Chloride PCEA Rescue Clinician Bolus 5 milliLiter(s) Epidural every 15 minutes PRN Moderate Pain (4 - 6)  glycerin Suppository - Adult 1 Suppository(s) Rectal at bedtime PRN Constipation  lanolin Ointment 1 Application(s) Topical every 3 hours PRN Sore Nipples  lanolin Ointment 1 Application(s) Topical every 3 hours PRN Sore Nipples  naloxone Injectable 0.1 milliGRAM(s) IV Push every 3 minutes PRN For ANY of the following changes in patient status:  A. RR LESS THAN 10 breaths per minute, B. Oxygen saturation LESS THAN 90%, C. Sedation score of 6  ondansetron Injectable 4 milliGRAM(s) IV Push every 6 hours PRN Nausea  oxyCODONE    IR 5 milliGRAM(s) Oral every 4 hours PRN Severe Pain (7 - 10)  simethicone 80 milliGRAM(s) Chew every 4 hours PRN Gas        Assessment and Plan  POD #1 s/p  section  Doing well.  Encourage ambulation.  check cbc on POD 1 and 3  routine PO care
Postpartum Note-  Section POD#1    Prenatal Labs  Blood type: A Positive  Rubella IgG: IMM  RPR: Negative          S:Patient c/o incisional pain.  Pt is OOB, tolerating PO, no flatus. DTV. Nyasiaa WNL.     O:  Vital Signs Last 24 Hrs  T(C): 36.7 (2018 05:30), Max: 37.2 (2018 00:40)  T(F): 98.1 (2018 05:30), Max: 99 (2018 00:40)  HR: 71 (2018 03:15) (70 - 90)  BP: 126/68 (2018 03:15) (118/57 - 140/63)  BP(mean): 83 (2018 00:40) (81 - 94)  RR: 18 (2018 05:30) (16 - 30)  SpO2: 98% (2018 05:30) (96% - 100%)  I&O's Summary    2018 07:01  -  2018 07:00  --------------------------------------------------------  IN: 3887.5 mL / OUT: 2950 mL / NET: 937.5 mL        Gen: NAD  Abdomen: Soft, appropriate tenderness, softly distended, fundus firm.  Incision: Clean/dry/ intact. no erythema, no ecchymosis   Sub Q     Nyasiaa WNL  Ext:Nontender    LABS:             10.9   16.1  )-----------( 87       (  @ 06:00 )             30.8                12.5   6.8   )-----------( 107      (  @ 21:40 )             35.1
Postpartum Note-  Section POD#2    Allergies    penicillin (Anaphylaxis)    Intolerances        Prenatal Labs:    Rubella:    Immune                 RPR:   Negative                  Blood Type: A+    S:Patient is a  33y     POD#2 S/P C/Sec  Subjective: Patient w/o complaints, pain is controlled.  Pt is OOB, tolerating PO, passing flatus, and voiding. Lochia WNL.   Feeding: Breastfeeding    O:  Vital Signs Last 24 Hrs  T(C): 37 (2018 05:00), Max: 37 (2018 05:00)  T(F): 98.6 (2018 05:00), Max: 98.6 (2018 05:00)  HR: 64 (2018 05:00) (58 - 71)  BP: 100/61 (2018 05:00) (100/61 - 130/70)  BP(mean): --  RR: 18 (2018 05:00) (18 - 20)  SpO2: 98% (2018 00:20) (98% - 98%)      Gen: NAD  CV: rrr s1s2, CTABL  Abdomen: Soft, nontender, non distened, fundus firm.  Bowel Sounds x 4 quadrants  Incision: Clean, dry, and intact.  Negative erythema/edema/ecchymosis.   SubQ  Lochia WNL  Ext: Neg edema, Neg calf tenderness.  Pedal pulses palpated B/L    LABS:                          10.9   16.1  )-----------( 87       ( 2018 06:00 )             30.8       A/P:  33y  POD # 2 S/P  repeat  section for Category 2 tracing, doing well    PMHx: none  Current Issues: none    Increase OOB  D/C IVF  D/C PCEA  PO Pain Protocol  Continue Regular Diet  Continue Routine Postop/Postpartum Care
- - -

## 2018-04-26 NOTE — DISCHARGE NOTE OB - CARE PLAN
Principal Discharge DX:	 delivery delivered  Goal:	back to baseline  Assessment and plan of treatment:	back to baseline function

## 2018-04-26 NOTE — DISCHARGE NOTE OB - HOSPITAL COURSE
Pt underwent a C/S without any complications. Her postpartum course was uneventful. She met all her milestones in regards to her vitals, postpartum labs, diet, ambulation, pain management. Follow up discussed. Pt was discharged home on POD#3

## 2018-04-26 NOTE — DISCHARGE NOTE OB - CARE PROVIDER_API CALL
Tony Hutchins), Obstetrics and Gynecology  [address] [place] [address]  Phone: [phone]  Fax: [phone]

## 2018-04-28 ENCOUNTER — EMERGENCY (EMERGENCY)
Facility: HOSPITAL | Age: 33
LOS: 1 days | Discharge: HOME | End: 2018-04-28
Attending: EMERGENCY MEDICINE
Payer: MEDICAID

## 2018-04-28 VITALS
DIASTOLIC BLOOD PRESSURE: 70 MMHG | SYSTOLIC BLOOD PRESSURE: 110 MMHG | RESPIRATION RATE: 18 BRPM | OXYGEN SATURATION: 96 % | TEMPERATURE: 99 F | HEART RATE: 75 BPM

## 2018-04-28 VITALS
TEMPERATURE: 99 F | RESPIRATION RATE: 20 BRPM | DIASTOLIC BLOOD PRESSURE: 79 MMHG | HEART RATE: 89 BPM | SYSTOLIC BLOOD PRESSURE: 122 MMHG | OXYGEN SATURATION: 97 % | HEIGHT: 64 IN

## 2018-04-28 DIAGNOSIS — Z98.891 HISTORY OF UTERINE SCAR FROM PREVIOUS SURGERY: Chronic | ICD-10-CM

## 2018-04-28 LAB
ALBUMIN SERPL ELPH-MCNC: 2.7 G/DL — LOW (ref 3.3–5)
ALP SERPL-CCNC: 214 U/L — HIGH (ref 40–120)
ALT FLD-CCNC: 29 U/L — SIGNIFICANT CHANGE UP (ref 10–45)
ANION GAP SERPL CALC-SCNC: 11 MMOL/L — SIGNIFICANT CHANGE UP (ref 5–17)
APPEARANCE UR: CLEAR — SIGNIFICANT CHANGE UP
AST SERPL-CCNC: 29 U/L — SIGNIFICANT CHANGE UP (ref 10–40)
BACTERIA # UR AUTO: ABNORMAL /HPF
BASOPHILS # BLD AUTO: 0 K/UL — SIGNIFICANT CHANGE UP (ref 0–0.2)
BASOPHILS NFR BLD AUTO: 0.2 % — SIGNIFICANT CHANGE UP (ref 0–2)
BILIRUB SERPL-MCNC: 0.2 MG/DL — SIGNIFICANT CHANGE UP (ref 0.2–1.2)
BILIRUB UR-MCNC: NEGATIVE — SIGNIFICANT CHANGE UP
BUN SERPL-MCNC: 6 MG/DL — LOW (ref 7–23)
CALCIUM SERPL-MCNC: 8.8 MG/DL — SIGNIFICANT CHANGE UP (ref 8.4–10.5)
CHLORIDE SERPL-SCNC: 103 MMOL/L — SIGNIFICANT CHANGE UP (ref 96–108)
CO2 SERPL-SCNC: 28 MMOL/L — SIGNIFICANT CHANGE UP (ref 22–31)
COLOR SPEC: SIGNIFICANT CHANGE UP
CREAT SERPL-MCNC: 0.77 MG/DL — SIGNIFICANT CHANGE UP (ref 0.5–1.3)
DIFF PNL FLD: ABNORMAL
EOSINOPHIL # BLD AUTO: 0.1 K/UL — SIGNIFICANT CHANGE UP (ref 0–0.5)
EOSINOPHIL NFR BLD AUTO: 0.9 % — SIGNIFICANT CHANGE UP (ref 0–6)
EPI CELLS # UR: SIGNIFICANT CHANGE UP /HPF
GLUCOSE SERPL-MCNC: 108 MG/DL — HIGH (ref 70–99)
GLUCOSE UR QL: NEGATIVE — SIGNIFICANT CHANGE UP
HCT VFR BLD CALC: 28.8 % — LOW (ref 34.5–45)
HGB BLD-MCNC: 9.9 G/DL — LOW (ref 11.5–15.5)
KETONES UR-MCNC: NEGATIVE — SIGNIFICANT CHANGE UP
LEUKOCYTE ESTERASE UR-ACNC: ABNORMAL
LYMPHOCYTES # BLD AUTO: 1 K/UL — SIGNIFICANT CHANGE UP (ref 1–3.3)
LYMPHOCYTES # BLD AUTO: 11.2 % — LOW (ref 13–44)
MCHC RBC-ENTMCNC: 33.8 PG — SIGNIFICANT CHANGE UP (ref 27–34)
MCHC RBC-ENTMCNC: 34.5 GM/DL — SIGNIFICANT CHANGE UP (ref 32–36)
MCV RBC AUTO: 98 FL — SIGNIFICANT CHANGE UP (ref 80–100)
MONOCYTES # BLD AUTO: 0.7 K/UL — SIGNIFICANT CHANGE UP (ref 0–0.9)
MONOCYTES NFR BLD AUTO: 7.6 % — SIGNIFICANT CHANGE UP (ref 2–14)
NEUTROPHILS # BLD AUTO: 7.5 K/UL — HIGH (ref 1.8–7.4)
NEUTROPHILS NFR BLD AUTO: 80.1 % — HIGH (ref 43–77)
NITRITE UR-MCNC: NEGATIVE — SIGNIFICANT CHANGE UP
PH UR: 8 — SIGNIFICANT CHANGE UP (ref 5–8)
PLATELET # BLD AUTO: 204 K/UL — SIGNIFICANT CHANGE UP (ref 150–400)
POTASSIUM SERPL-MCNC: 3.9 MMOL/L — SIGNIFICANT CHANGE UP (ref 3.5–5.3)
POTASSIUM SERPL-SCNC: 3.9 MMOL/L — SIGNIFICANT CHANGE UP (ref 3.5–5.3)
PROT SERPL-MCNC: 6 G/DL — SIGNIFICANT CHANGE UP (ref 6–8.3)
PROT UR-MCNC: NEGATIVE — SIGNIFICANT CHANGE UP
RBC # BLD: 2.94 M/UL — LOW (ref 3.8–5.2)
RBC # FLD: 11.8 % — SIGNIFICANT CHANGE UP (ref 10.3–14.5)
RBC CASTS # UR COMP ASSIST: ABNORMAL /HPF (ref 0–2)
SODIUM SERPL-SCNC: 142 MMOL/L — SIGNIFICANT CHANGE UP (ref 135–145)
SP GR SPEC: 1.01 — LOW (ref 1.01–1.02)
UROBILINOGEN FLD QL: NEGATIVE — SIGNIFICANT CHANGE UP
WBC # BLD: 9.4 K/UL — SIGNIFICANT CHANGE UP (ref 3.8–10.5)
WBC # FLD AUTO: 9.4 K/UL — SIGNIFICANT CHANGE UP (ref 3.8–10.5)
WBC UR QL: >50 /HPF (ref 0–5)

## 2018-04-28 PROCEDURE — 71046 X-RAY EXAM CHEST 2 VIEWS: CPT | Mod: 26

## 2018-04-28 PROCEDURE — 99284 EMERGENCY DEPT VISIT MOD MDM: CPT

## 2018-04-28 PROCEDURE — 71046 X-RAY EXAM CHEST 2 VIEWS: CPT

## 2018-04-28 PROCEDURE — 87086 URINE CULTURE/COLONY COUNT: CPT

## 2018-04-28 PROCEDURE — 80053 COMPREHEN METABOLIC PANEL: CPT

## 2018-04-28 PROCEDURE — 74177 CT ABD & PELVIS W/CONTRAST: CPT

## 2018-04-28 PROCEDURE — 85027 COMPLETE CBC AUTOMATED: CPT

## 2018-04-28 PROCEDURE — 74177 CT ABD & PELVIS W/CONTRAST: CPT | Mod: 26

## 2018-04-28 PROCEDURE — 81001 URINALYSIS AUTO W/SCOPE: CPT

## 2018-04-28 RX ORDER — NITROFURANTOIN MACROCRYSTAL 50 MG
100 CAPSULE ORAL ONCE
Qty: 0 | Refills: 0 | Status: COMPLETED | OUTPATIENT
Start: 2018-04-28 | End: 2018-04-28

## 2018-04-28 RX ORDER — NITROFURANTOIN MACROCRYSTAL 50 MG
1 CAPSULE ORAL
Qty: 14 | Refills: 0 | OUTPATIENT
Start: 2018-04-28 | End: 2018-05-04

## 2018-04-28 RX ADMIN — Medication 100 MILLIGRAM(S): at 23:51

## 2018-04-28 NOTE — ED PROVIDER NOTE - PROGRESS NOTE DETAILS
CT completed. Discussed with OB. To be discharged with RXN for ABX (recommended keflex but allergic to PCN) secondary to UTI. RXN for Macrobid provided. Dose given in ED.

## 2018-04-28 NOTE — ED PROVIDER NOTE - OBJECTIVE STATEMENT
32 y/o F with no significant pmhx presents with fever, abd pain x2 days and productive cough x1 days. Pt notes the abd pain and fever started 1 day s/p her C section.  Fever was noted to be 38.4 C 2 days ago, 37.8 yesterday and 38.6 C today so pt decided to come to the ED.   Endorses taking Tylenol with minimal relief. Pt also notes swelling in both her legs which started after the . Pt was discharged from the ED 3 days ago. Abd pain is now rated a 6/10 in severity. Reports she has been having increased urinary urges than prior to pregnancy. Denies dysuria. Allergic to penicillin. Not on any other meds at this time.   OB: Dr. Sarthak Hutchins 32 y/o F with no significant pmhx presents with fever x 2 days a/w productive cough x 1 day. Pt has had abd pain but ever since her . Fever was noted to be 38.4 C 2 days ago, 37.8 C yesterday and 38.6 C today so pt decided to come to the ED. Endorses taking Tylenol with minimal relief. Pt also notes swelling in both her legs which started after the . Pt was discharged from the ED 3 days ago. Abd pain is now rated a 6/10 in severity. Reports she has been having increased urinary urges than prior to pregnancy. Denies dysuria. Allergic to penicillin. Not on any other meds at this time.   OB: Dr. Sarthak Hutchins (performed )

## 2018-04-28 NOTE — ED ADULT NURSE NOTE - OBJECTIVE STATEMENT
Patient is 5 days post . Patients first child and first pregnancy.  scar is intact with some redness on the surrounding skin. Patient stopped breast feeding as she was concerned that taking tylenol would not be good for the baby. Here today for non-productive cough and fever to 101+ at home.

## 2018-04-28 NOTE — ED PROVIDER NOTE - MEDICAL DECISION MAKING DETAILS
Discharged from hospital 2 days ago. Now POD #5. Respiratory symptoms with some erythema along lower aspect of abdomen approximating incision site and B/L LE edema. BP WNLs. Screening labs and CXR ordered. OB consulted.

## 2018-04-28 NOTE — CONSULT NOTE ADULT - ASSESSMENT
32yo  POD#5 s/p pLTCS w/fevers, abdominal pain, respiratory symptoms.    -CBC,CMP, UA,Ucx, Bcx, CXR      MANDIE Alanis, PGY2 32yo  POD#5 s/p pLTCS w/fevers, abdominal pain, respiratory symptoms.    -CBC,CMP, UA,Ucx, Bcx, CXR  -CT A/P pelvis to r/o any source of infection    MANDIE Alanis, PGY2  D/W Dr. Jones 32yo  POD#5 s/p pLTCS w/fevers, abdominal pain, respiratory symptoms.    -CBC,CMP, UA,Ucx, Bcx, CXR  -CT A/P pelvis to r/o any source of infection    MANDIE Alanis, PGY2  D/W Dr. Jones    Update:  UA=+ large leuks, CT negative  Patient can be discharged with Keflex x 5 days  Follow up in office    MANDIE Alanis PGY2  D/W Dr. Quevedo

## 2018-04-28 NOTE — CONSULT NOTE ADULT - SUBJECTIVE AND OBJECTIVE BOX
33y  POD#5 s/p pLTCS for NRFHT presents c/o fevers since day of discharge (), abdominal pain and productive cough x 1 day and bilateral breast pain. Highest fever at home was 38.6 today. She has been taking Tylenol. Also reports abdominal rash which was present in the hospital.  Currently breastfeeding. Denies N/V/CP,vb, foul smelling discharge.    OB/GYN HISTORY:  pLTCS 2018, ectopic x 1 ()  PAST MEDICAL & SURGICAL HISTORY:  No pertinent past medical history  History of     Allergies    penicillin (Anaphylaxis)      Vital Signs Last 24 Hrs  T(C): 37.2 (2018 18:47), Max: 37.2 (2018 18:47)  T(F): 99 (2018 18:47), Max: 99 (2018 18:47)  HR: 89 (2018 18:47) (89 - 89)  BP: 122/79 (2018 18:47) (122/79 - 122/79)  BP(mean): --  RR: 20 (2018 18:47) (20 - 20)  SpO2: 97% (2018 18:47) (97% - 97%)    PHYSICAL EXAM:      Constitutional: alert and oriented x 3    Respiratory: clear    Cardiovascular: regular rate and rhythm    Gastrointestinal: soft, +tenderness across mid abdomen, + bowel sounds. No hepatosplenomegaly, no palpable masses  Incision C/D/I  Cervix: closed/ long, no CMT  Adnexa: non tender        LABS:                Blood Type: A Positive      RADIOLOGY & ADDITIONAL STUDIES: 33y  POD#5 s/p pLTCS for NRFHT presents c/o fevers since day of discharge (), abdominal pain and productive cough x 1 day and bilateral breast pain. Highest fever at home was 38.6 today. She has been taking Tylenol. Also reports abdominal rash which was present in the hospital.  Currently breastfeeding. Denies N/V/CP,vb, foul smelling discharge.    OB/GYN HISTORY:  pLTCS 2018, ectopic x 1 ()  PAST MEDICAL & SURGICAL HISTORY:  No pertinent past medical history  History of     Allergies    penicillin (Anaphylaxis)      Vital Signs Last 24 Hrs  T(C): 37.2 (2018 18:47), Max: 37.2 (2018 18:47)  T(F): 99 (2018 18:47), Max: 99 (2018 18:47)  HR: 89 (2018 18:47) (89 - 89)  BP: 122/79 (2018 18:47) (122/79 - 122/79)  BP(mean): --  RR: 20 (2018 18:47) (20 - 20)  SpO2: 97% (2018 18:47) (97% - 97%)    PHYSICAL EXAM:      Constitutional: alert and oriented x 3    Respiratory: clear    Cardiovascular: regular rate and rhythm    Gastrointestinal: soft, +tenderness across mid abdomen, + bowel sounds. No hepatosplenomegaly, no palpable masses  Incision C/D/I  Cervix: closed/ long, no CMT  Adnexa: non tender        I&O's Detail                            9.9    9.4   )-----------( 204      ( 2018 20:00 )             28.8       04-    142  |  103  |  6<L>  ----------------------------<  108<H>  3.9   |  28  |  0.77    Ca    8.8      2018 20:00    TPro  6.0  /  Alb  2.7<L>  /  TBili  0.2  /  DBili  x   /  AST  29  /  ALT  29  /  AlkPhos  214<H>  -      CAPILLARY BLOOD GLUCOSE          LIVER FUNCTIONS - ( 2018 20:00 )  Alb: 2.7 g/dL / Pro: 6.0 g/dL / ALK PHOS: 214 U/L / ALT: 29 U/L / AST: 29 U/L / GGT: x                 Urinalysis Basic - ( 2018 20:00 )    Color: PL Yellow / Appearance: Clear / S.009 / pH: x  Gluc: x / Ketone: Negative  / Bili: Negative / Urobili: Negative   Blood: x / Protein: Negative / Nitrite: Negative   Leuk Esterase: Large / RBC: 5-10 /HPF / WBC >50 /HPF   Sq Epi: x / Non Sq Epi: OCC /HPF / Bacteria: Few /HPF                  Blood Type: A Positive      RADIOLOGY & ADDITIONAL STUDIES:  < from: CT Abdomen and Pelvis w/ IV Cont (18 @ 21:43) >  LOWER CHEST: Trace bibasilar subsegmental atelectasis.    LIVER: Within normal limits.  BILE DUCTS: Normal caliber.  GALLBLADDER: Within normal limits.  SPLEEN: Within normal limits.  PANCREAS: Within normal limits.  ADRENALS: Within normal limits.  KIDNEYS/URETERS: Within normal limits.    BLADDER: Within normal limits.  REPRODUCTIVE ORGANS: Heterogeneous appearing postoperative uterus with a   moderate amount of air within the endometrium and surrounding the uterus   in the pelvis.     BOWEL: Mildly prominent loops of small bowel likely representing   postoperative ileus. Appendix is within normal limits.  PERITONEUM: Small volume free fluid in the pelvis.  VESSELS:  Within normal limits.  RETROPERITONEUM/SUBPERITONEUM: No lymphadenopathy.    ABDOMINAL WALL: Postoperative changes in the lower abdominal wall.  MUSCULOSKELETAL: Within normal limits.    IMPRESSION:     No intra-abdominal abscess collection.    Postoperative uterus with a moderate amount air within the endometrium.   Superimposed infection cannot be excluded.      < end of copied text >

## 2018-04-28 NOTE — ED PROVIDER NOTE - GASTROINTESTINAL, MLM
Abdomen soft but tender across its lower aspect and incision which is C/D/I with erythema more so along its superior aspect, no guarding.

## 2018-04-29 LAB
CULTURE RESULTS: SIGNIFICANT CHANGE UP
SPECIMEN SOURCE: SIGNIFICANT CHANGE UP

## 2018-04-30 NOTE — ED POST DISCHARGE NOTE - OTHER COMMUNICATION
spoke with patient and , states still febrile, not feeling well, rednessrash at incision. recommend gyn follow up and if unable to get in should return to ED for evaluation. - Cydney Moore PA-C

## 2018-05-01 ENCOUNTER — OUTPATIENT (OUTPATIENT)
Dept: OUTPATIENT SERVICES | Facility: HOSPITAL | Age: 33
LOS: 1 days | End: 2018-05-01
Payer: MEDICAID

## 2018-05-01 DIAGNOSIS — Z98.891 HISTORY OF UTERINE SCAR FROM PREVIOUS SURGERY: Chronic | ICD-10-CM

## 2018-05-01 PROCEDURE — G9001: CPT

## 2018-05-09 DIAGNOSIS — R69 ILLNESS, UNSPECIFIED: ICD-10-CM

## 2018-06-02 LAB — SURGICAL PATHOLOGY STUDY: SIGNIFICANT CHANGE UP

## 2021-03-16 NOTE — ED ADULT NURSE NOTE - NS ED NURSE IV DC DT
03-16 Na136 mmol/L Glu 273 mg/dL<H> K+ 3.6 mmol/L Cr  1.17 mg/dL BUN 18 mg/dL 03-16 Phos 2.7 mg/dL 03-16 Alb 2.9 g/dL<L>03-16 ALT 41 U/L AST 45 U/L<H> Alkaline Phosphatase 50 U/L  03-14-21 @ 11:16 a1c 8.3
28-Apr-2018 23:53

## 2022-01-04 NOTE — PATIENT PROFILE OB - TEACHING/LEARNING FACTORS INFLUENCE READINESS TO LEARN
Individual Follow-Up Form    1/4/2022    Quit Date: 12/6/21    Clinical Status of Patient: Outpatient    Length of Service: 60 minutes    Continuing Medication: yes  Patches    Other Medications: lozenges     Target Symptoms: Withdrawal and medication side effects. The following were  rated moderate (3) to severe (4) on TCRS:  · Moderate (3): none  · Severe (4): none    Comments: Patient remains tobacco free and using the 21 mg patch and lozenges with no negative side effects at this time. We discussed urges, cravings, and relapse prevention strategies. Patient has a follow up in 1 week.    Diagnosis: F17.200    Next Visit: 1 week    
none